# Patient Record
Sex: FEMALE | Race: WHITE | NOT HISPANIC OR LATINO | Employment: FULL TIME | URBAN - METROPOLITAN AREA
[De-identification: names, ages, dates, MRNs, and addresses within clinical notes are randomized per-mention and may not be internally consistent; named-entity substitution may affect disease eponyms.]

---

## 2021-06-11 ENCOUNTER — OFFICE VISIT (OUTPATIENT)
Dept: OBSTETRICS AND GYNECOLOGY | Facility: CLINIC | Age: 51
End: 2021-06-11
Payer: COMMERCIAL

## 2021-06-11 VITALS
HEART RATE: 67 BPM | WEIGHT: 185 LBS | SYSTOLIC BLOOD PRESSURE: 123 MMHG | HEIGHT: 67 IN | DIASTOLIC BLOOD PRESSURE: 83 MMHG | BODY MASS INDEX: 29.03 KG/M2

## 2021-06-11 DIAGNOSIS — Z01.419 ENCOUNTER FOR WELL WOMAN EXAM WITH ROUTINE GYNECOLOGICAL EXAM: ICD-10-CM

## 2021-06-11 DIAGNOSIS — Z12.31 BREAST CANCER SCREENING BY MAMMOGRAM: ICD-10-CM

## 2021-06-11 PROCEDURE — 3008F BODY MASS INDEX DOCD: CPT | Mod: CPTII,S$GLB,, | Performed by: OBSTETRICS & GYNECOLOGY

## 2021-06-11 PROCEDURE — 99396 PR PREVENTIVE VISIT,EST,40-64: ICD-10-PCS | Mod: S$GLB,,, | Performed by: OBSTETRICS & GYNECOLOGY

## 2021-06-11 PROCEDURE — 99396 PREV VISIT EST AGE 40-64: CPT | Mod: S$GLB,,, | Performed by: OBSTETRICS & GYNECOLOGY

## 2021-06-11 PROCEDURE — 3008F PR BODY MASS INDEX (BMI) DOCUMENTED: ICD-10-PCS | Mod: CPTII,S$GLB,, | Performed by: OBSTETRICS & GYNECOLOGY

## 2021-06-11 RX ORDER — LIOTHYRONINE SODIUM 5 UG/1
5 TABLET ORAL DAILY
COMMUNITY
Start: 2021-04-07 | End: 2022-06-17

## 2021-06-11 RX ORDER — LEVOTHYROXINE SODIUM 100 UG/1
100 TABLET ORAL DAILY
COMMUNITY
Start: 2021-04-07

## 2021-07-02 ENCOUNTER — TELEPHONE (OUTPATIENT)
Dept: OBSTETRICS AND GYNECOLOGY | Facility: CLINIC | Age: 51
End: 2021-07-02

## 2022-06-17 ENCOUNTER — OFFICE VISIT (OUTPATIENT)
Dept: OBSTETRICS AND GYNECOLOGY | Facility: CLINIC | Age: 52
End: 2022-06-17
Payer: COMMERCIAL

## 2022-06-17 VITALS
BODY MASS INDEX: 26.84 KG/M2 | HEIGHT: 67 IN | DIASTOLIC BLOOD PRESSURE: 76 MMHG | WEIGHT: 171 LBS | SYSTOLIC BLOOD PRESSURE: 109 MMHG

## 2022-06-17 DIAGNOSIS — Z12.31 BREAST CANCER SCREENING BY MAMMOGRAM: ICD-10-CM

## 2022-06-17 DIAGNOSIS — Z78.0 ENCOUNTER FOR OSTEOPOROSIS SCREENING IN ASYMPTOMATIC POSTMENOPAUSAL PATIENT: ICD-10-CM

## 2022-06-17 DIAGNOSIS — Z13.820 ENCOUNTER FOR OSTEOPOROSIS SCREENING IN ASYMPTOMATIC POSTMENOPAUSAL PATIENT: ICD-10-CM

## 2022-06-17 DIAGNOSIS — Z01.419 ENCOUNTER FOR WELL WOMAN EXAM WITH ROUTINE GYNECOLOGICAL EXAM: Primary | ICD-10-CM

## 2022-06-17 PROCEDURE — 3008F BODY MASS INDEX DOCD: CPT | Mod: CPTII,S$GLB,, | Performed by: OBSTETRICS & GYNECOLOGY

## 2022-06-17 PROCEDURE — 3074F SYST BP LT 130 MM HG: CPT | Mod: CPTII,S$GLB,, | Performed by: OBSTETRICS & GYNECOLOGY

## 2022-06-17 PROCEDURE — 1159F PR MEDICATION LIST DOCUMENTED IN MEDICAL RECORD: ICD-10-PCS | Mod: CPTII,S$GLB,, | Performed by: OBSTETRICS & GYNECOLOGY

## 2022-06-17 PROCEDURE — 3074F PR MOST RECENT SYSTOLIC BLOOD PRESSURE < 130 MM HG: ICD-10-PCS | Mod: CPTII,S$GLB,, | Performed by: OBSTETRICS & GYNECOLOGY

## 2022-06-17 PROCEDURE — 3078F PR MOST RECENT DIASTOLIC BLOOD PRESSURE < 80 MM HG: ICD-10-PCS | Mod: CPTII,S$GLB,, | Performed by: OBSTETRICS & GYNECOLOGY

## 2022-06-17 PROCEDURE — 3008F PR BODY MASS INDEX (BMI) DOCUMENTED: ICD-10-PCS | Mod: CPTII,S$GLB,, | Performed by: OBSTETRICS & GYNECOLOGY

## 2022-06-17 PROCEDURE — 99396 PR PREVENTIVE VISIT,EST,40-64: ICD-10-PCS | Mod: S$GLB,,, | Performed by: OBSTETRICS & GYNECOLOGY

## 2022-06-17 PROCEDURE — 99396 PREV VISIT EST AGE 40-64: CPT | Mod: S$GLB,,, | Performed by: OBSTETRICS & GYNECOLOGY

## 2022-06-17 PROCEDURE — 1159F MED LIST DOCD IN RCRD: CPT | Mod: CPTII,S$GLB,, | Performed by: OBSTETRICS & GYNECOLOGY

## 2022-06-17 PROCEDURE — 3078F DIAST BP <80 MM HG: CPT | Mod: CPTII,S$GLB,, | Performed by: OBSTETRICS & GYNECOLOGY

## 2022-06-17 RX ORDER — TOPIRAMATE 50 MG/1
TABLET, FILM COATED ORAL
COMMUNITY
Start: 2022-04-14 | End: 2023-11-10

## 2022-06-17 RX ORDER — PANTOPRAZOLE SODIUM 40 MG/1
40 TABLET, DELAYED RELEASE ORAL DAILY
COMMUNITY
Start: 2022-04-11

## 2022-06-17 NOTE — PROGRESS NOTES
CC: WELL WOMAN (age 45-59)-perimenopausal  Patient Care Team:  Barbra Ingram MD as PCP - General (General Practice)  Boaz Evans MD (Gastroenterology)    The patient's last visit with me was on 2021 for wwe    HPI:  Patient is a 51 y.o. who presents for her well woman exam today.  History reviewed with patient.   Patient is without complaints or concerns today. Periods still regular but light and only last 1-2 days.  Only occasional hot flashes, mostly at night right before period starts.     Patient's last menstrual period was 2022 (approximate).       CONTRACEPTION: none  HX ABNL PAPS: none    REVIEW OF PRIOR DATA/ HEALTH MAINTENANCE:  LAST ANNUAL/ PAP:   2021   LAST MMG (screening)- May 14 2021- normal at [facility]    LAST LABS- in the last few months with pcp   LAST COLONOSCOPY- - polyps- Q 3 yrs- by Dr. Evans        Past Medical History:   Diagnosis Date    Hypothyroid     OAB (overactive bladder)     saw lakhwinder- resolved once she stopped her apple cider vinegar    Ovarian cyst     Right elbow tendonitis      SURGICAL HX:   has a past surgical history that includes Tonsillectomy; Exploratory laparotomy; Uterine suspension; Sinus surgery (); Foot surgery (); Hemorrhoid surgery (); and Surgical removal of Garica's neuroma.    SOCIAL HX:    reports that she has never smoked. She has never used smokeless tobacco. She reports previous alcohol use. She reports that she does not use drugs.    FAMILY HX:   family history includes Heart attack in her mother; Melanoma (age of onset: 34) in her father; No Known Problems in her brother, maternal grandfather, maternal grandmother, paternal grandfather, paternal grandmother, and sister. .    ALLERGIES:  Erythromycin, Pcn [penicillins], Sulfa (sulfonamide antibiotics), and Tylox [oxycodone-acetaminophen]    Current Outpatient Medications:     levothyroxine (SYNTHROID) 100 MCG tablet, Take 100 mcg by mouth once  "daily., Disp: , Rfl:     pantoprazole (PROTONIX) 40 MG tablet, Take 40 mg by mouth once daily., Disp: , Rfl:     topiramate (TOPAMAX) 50 MG tablet, TAKE ONE TO TWO TABLETS BY MOUTH EVERY NIGHT AT BEDTIME, Disp: , Rfl:     ROS:  CONST:  No fever, chills, fatigue or unexpected changes in weight  CV: No chest pain or palpitations  RESP:  No shortness of breath or cough  GI: No abd pain, vomiting, diarrhea, blood in stool, or changes in bowel mvmts  SKIN: No rashes or lesions  MUSCULOSKELETAL: No joint swelling or pain  PSYCH: No changes in mood or insomia  BREASTS: No asymmetry, lumps, pain, nipple discharge, or skin changes  :  No dysuria, urgency, frequency, hematuria or incontinence          No vag dc, itching, odor or dryness          No pelvic pain, dyspareunia, or abnormal vaginal bleeding    VITALS:  Blood pressure 109/76, height 5' 7" (1.702 m), weight 77.6 kg (171 lb), last menstrual period 06/03/2022.  Body mass index is 26.78 kg/m².     PHYSICAL EXAM-  APPEARANCE: Well appearing, in no acute distress.  NECK: Neck symmetric without masses or thyromegaly.  CV:  Normal rate  PULM: Normal resp rate, no resp distress, normal resp effort  PSYCH: Normal mood and affect, cooperative  SKIN: No rashes, lesions, or abnormal bruising  LYMPH: No inguinal or axillary adenopathy  ABD: Soft, without tenderness or masses. No palpable hernias or hsm  BREASTS: Symmetrical, no skin changes or lesions. No palpable masses, tenderness, or nipple dc  PELVIC:  VULVA: No lesions. Normal female genitalia.  URETHRAL MEATUS: No masses, no prolapse.  BLADDER/ URETHRA: No masses or suprapubic tenderness  VAGINA: No discharge, erythema, or lesions. No significant cystocele or rectocele.   CVX: No lesions,no cervical motion tenderness   UTERUS: Normal size/shape, mobile, non-tender  ADNEXA: No masses, tenderness, or fullness.  ANUS/ PERINEUM: Normal tone.  No lesions.  *female chaperone present for entire exam    ASSESSMENT and " PLAN:  Encounter for well woman exam with routine gynecological exam  -     Liquid-based pap smear, screening    Breast cancer screening by mammogram  -     Mammo Digital Screening Bilat w/ Tonio; Future; Expected date: 07/01/2022    Encounter for osteoporosis screening in asymptomatic postmenopausal patient  -     DXA Bone Density Spine And Hip; Future; Expected date: 07/01/2022     FOLLOWUP:  1 year for wwe or sooner prn    COUNSELING:  Patient was counseled today on recommendation for yearly pelvic exam, current Pap guidelines, self breast exams, contraception, recommendations for annual screening mammograms, and routine screening colonoscopy at age 45.  Encouraged patient to see her PCP for other health maintenance.

## 2023-08-14 ENCOUNTER — PATIENT MESSAGE (OUTPATIENT)
Dept: GASTROENTEROLOGY | Facility: CLINIC | Age: 53
End: 2023-08-14
Payer: COMMERCIAL

## 2023-08-14 DIAGNOSIS — K21.9 GASTROESOPHAGEAL REFLUX DISEASE, UNSPECIFIED WHETHER ESOPHAGITIS PRESENT: Primary | ICD-10-CM

## 2023-08-14 DIAGNOSIS — Z86.010 PERSONAL HISTORY OF COLONIC POLYPS: ICD-10-CM

## 2023-08-16 ENCOUNTER — TELEPHONE (OUTPATIENT)
Dept: GASTROENTEROLOGY | Facility: CLINIC | Age: 53
End: 2023-08-16
Payer: COMMERCIAL

## 2023-08-16 NOTE — TELEPHONE ENCOUNTER
----- Message from Payal Caal sent at 8/16/2023 10:51 AM CDT -----  Contact: Rosario Ingram  Calling to follow up on a fax that was sent on 08/11 rg Dr Ingram wanting to know if an EGD can be done at the same time due to having a lot of toruble with her reflux and can be reached at /thanks/dbw

## 2023-08-17 NOTE — TELEPHONE ENCOUNTER
----- Message from Kaylen Peralta sent at 8/17/2023  8:41 AM CDT -----  Patient is calling in regards to medication for colonoscopy pre is not cover by insurance and if there's a medication that can be call in that's cover by insurance..Please call patient back at [phone].                Thanks  dell

## 2023-08-23 RX ORDER — MELOXICAM 7.5 MG/1
7.5 TABLET ORAL
COMMUNITY
Start: 2023-06-16 | End: 2023-11-10

## 2023-08-24 ENCOUNTER — OFFICE VISIT (OUTPATIENT)
Dept: OBSTETRICS AND GYNECOLOGY | Facility: CLINIC | Age: 53
End: 2023-08-24
Payer: COMMERCIAL

## 2023-08-24 VITALS — BODY MASS INDEX: 25.5 KG/M2 | DIASTOLIC BLOOD PRESSURE: 78 MMHG | SYSTOLIC BLOOD PRESSURE: 115 MMHG | WEIGHT: 158 LBS

## 2023-08-24 DIAGNOSIS — Z01.419 ENCOUNTER FOR WELL WOMAN EXAM WITH ROUTINE GYNECOLOGICAL EXAM: Primary | ICD-10-CM

## 2023-08-24 DIAGNOSIS — N92.6 MENSES, IRREGULAR: ICD-10-CM

## 2023-08-24 DIAGNOSIS — Z12.31 BREAST CANCER SCREENING BY MAMMOGRAM: ICD-10-CM

## 2023-08-24 PROCEDURE — 3074F PR MOST RECENT SYSTOLIC BLOOD PRESSURE < 130 MM HG: ICD-10-PCS | Mod: CPTII,S$GLB,, | Performed by: OBSTETRICS & GYNECOLOGY

## 2023-08-24 PROCEDURE — 99396 PR PREVENTIVE VISIT,EST,40-64: ICD-10-PCS | Mod: S$GLB,,, | Performed by: OBSTETRICS & GYNECOLOGY

## 2023-08-24 PROCEDURE — 3078F DIAST BP <80 MM HG: CPT | Mod: CPTII,S$GLB,, | Performed by: OBSTETRICS & GYNECOLOGY

## 2023-08-24 PROCEDURE — 3008F BODY MASS INDEX DOCD: CPT | Mod: CPTII,S$GLB,, | Performed by: OBSTETRICS & GYNECOLOGY

## 2023-08-24 PROCEDURE — 3078F PR MOST RECENT DIASTOLIC BLOOD PRESSURE < 80 MM HG: ICD-10-PCS | Mod: CPTII,S$GLB,, | Performed by: OBSTETRICS & GYNECOLOGY

## 2023-08-24 PROCEDURE — 3008F PR BODY MASS INDEX (BMI) DOCUMENTED: ICD-10-PCS | Mod: CPTII,S$GLB,, | Performed by: OBSTETRICS & GYNECOLOGY

## 2023-08-24 PROCEDURE — 3074F SYST BP LT 130 MM HG: CPT | Mod: CPTII,S$GLB,, | Performed by: OBSTETRICS & GYNECOLOGY

## 2023-08-24 PROCEDURE — 1159F MED LIST DOCD IN RCRD: CPT | Mod: CPTII,S$GLB,, | Performed by: OBSTETRICS & GYNECOLOGY

## 2023-08-24 PROCEDURE — 1159F PR MEDICATION LIST DOCUMENTED IN MEDICAL RECORD: ICD-10-PCS | Mod: CPTII,S$GLB,, | Performed by: OBSTETRICS & GYNECOLOGY

## 2023-08-24 PROCEDURE — 99396 PREV VISIT EST AGE 40-64: CPT | Mod: S$GLB,,, | Performed by: OBSTETRICS & GYNECOLOGY

## 2023-08-24 RX ORDER — NITROFURANTOIN 25; 75 MG/1; MG/1
CAPSULE ORAL
COMMUNITY
Start: 2023-08-16 | End: 2023-11-10

## 2023-08-24 NOTE — PROGRESS NOTES
CC: WELL WOMAN (age 45-59)-perimenopausal  Patient Care Team:  Barbra Ingram MD as PCP - General (General Practice)  Boaz Evans MD (Gastroenterology)    HPI:  Patient is a 53 y.o. who presents for her well woman exam today.  History reviewed with patient. Would like to check hormones as her cycles are irrgular now and started having night sweats. Just checked thyroid and normal with pcp  Patient is without complaints or concerns today.     Last visit with me was on  2022 for wwe    CONTRACEPTION: none  HX ABNL PAPS: none    REVIEW OF PRIOR DATA/ HEALTH MAINTENANCE:  LAST ANNUAL:   2022   LAST MMG (screening)- 2022- normal at [facility] -pcp orders   LAST LABS- up to date with PCP    LAST COLONOSCOPY- - by Dr. Evans - q 3 yrs for polyps     Past Medical History:   Diagnosis Date    BMI 25.0-25.9,adult     Hypothyroid     OAB (overactive bladder)     saw lakhwinder- resolved once she stopped her apple cider vinegar    Ovarian cyst     Right elbow tendonitis     Shoulder injury     Possible torn tendon     SURGICAL HX:   has a past surgical history that includes Tonsillectomy; Exploratory laparotomy; Uterine suspension; Sinus surgery (); Foot surgery (Left, ); Hemorrhoid surgery (); Surgical removal of Garcia's neuroma (Left); Colonoscopy (2020); Colonoscopy (2014); and [place] tooth extraction.    SOCIAL HX:    reports that she has never smoked. She has never used smokeless tobacco. She reports that she does not currently use alcohol after a past usage of about 2.0 standard drinks of alcohol per week. She reports that she does not use drugs.    FAMILY HX:   family history includes Colon cancer (age of onset: 34) in her maternal cousin; Heart attack in her mother; Liver disease (age of onset: 60) in her brother; Melanoma (age of onset: 61) in her father; No Known Problems in her maternal grandfather, maternal grandmother, paternal grandfather, and paternal  grandmother. .    ALLERGIES:  Erythromycin, Pcn [penicillins], Sulfa (sulfonamide antibiotics), and Tylox [oxycodone-acetaminophen]    Current Outpatient Medications   Medication Instructions    levothyroxine (SYNTHROID) 100 mcg, Oral, Daily    meloxicam (MOBIC) 7.5 mg, Oral    nitrofurantoin, macrocrystal-monohydrate, (MACROBID) 100 MG capsule TAKE ONE CAPSULE TWICE EVERY DAY FOR SEVEN DAYS    pantoprazole (PROTONIX) 40 mg, Oral, Daily    topiramate (TOPAMAX) 50 MG tablet TAKE ONE TO TWO TABLETS BY MOUTH EVERY NIGHT AT BEDTIME    WEGOVY 1.7 mg/0.75 mL PnIj INJECT 1.7 MILLIGRAMS BY SUBCUTANEOUS ROUTE EVERY WEEK ON THE SAME DAY OF EACH WEEK     ROS:  CONST:  No fever, chills, fatigue or unexpected changes in weight   CV: No chest pain or palpitations   RESP:  No shortness of breath or cough   GI: No abd pain, vomiting, diarrhea, blood in stool, or changes in bowel mvmts   SKIN: No rashes or lesions  MUSCULOSKELETAL: No joint swelling or pain   PSYCH: No changes in mood or insomia   BREASTS: No asymmetry, lumps, pain, nipple discharge, or skin changes   :  No dysuria, urgency, frequency, hematuria or incontinence           No vag dc, itching, odor or dryness           No pelvic pain, dyspareunia, or abnormal vaginal bleeding     VITALS:  Blood pressure 115/78, weight 71.7 kg (158 lb), last menstrual period 07/26/2023.  Body mass index is 25.5 kg/m².     PHYSICAL EXAM-  APPEARANCE: Well appearing, in no acute distress.   NECK: Neck symmetric.   CV:  Normal rate   PULM: Normal resp rate, no resp distress, normal resp effort    PSYCH: Normal mood and affect, cooperative   SKIN: No rashes, lesions, or abnormal bruising   LYMPH: No inguinal or axillary adenopathy   ABD: Soft, without tenderness or masses.   BREAST: Symmetrical, no nipple changes, no skin changes, No palpable masses   PELVIC:  VULVA: No lesions. Normal female genitalia.  URETHRAL MEATUS: No masses, no significant prolapse.   BLADDER/ URETHRA: No masses or  suprapubic tenderness   VAGINA: No lesions or erythema, No discharge.   CVX: No lesions,no cervical motion tenderness.   UTERUS: Normal size/shape, mobile, non-tender   ADNEXA: No masses, tenderness, or fullness.   ANUS/ PERINEUM: Normal tone.  No lesions.     *female chaperone present for entire exam      ASSESSMENT and PLAN:  Encounter for well woman exam with routine gynecological exam  -     Liquid-based pap smear, screening    Breast cancer screening by mammogram  -     Mammo Digital Screening Bilat w/ Tonio; Future; Expected date: 09/06/2023    Menses, irregular  -     Follicle Stimulating Hormone; Future  -     Luteinizing Hormone; Future       FOLLOWUP:  1 year for wwe or sooner prn    COUNSELING:  Patient was counseled today on recommendation for yearly pelvic exam, current Pap guidelines, self breast exams, contraception, recommendations for annual screening mammograms, and routine screening colonoscopy at age 45.  Encouraged patient to see her PCP for other health maintenance.

## 2023-08-31 LAB — Lab: NORMAL

## 2023-10-02 ENCOUNTER — TELEPHONE (OUTPATIENT)
Dept: GASTROENTEROLOGY | Facility: CLINIC | Age: 53
End: 2023-10-02
Payer: COMMERCIAL

## 2023-10-02 VITALS — HEIGHT: 66 IN | BODY MASS INDEX: 25.39 KG/M2 | WEIGHT: 158 LBS

## 2023-10-02 DIAGNOSIS — K21.9 GASTROESOPHAGEAL REFLUX DISEASE, UNSPECIFIED WHETHER ESOPHAGITIS PRESENT: Primary | ICD-10-CM

## 2023-10-02 DIAGNOSIS — Z86.010 PERSONAL HISTORY OF COLONIC POLYPS: ICD-10-CM

## 2023-10-02 NOTE — TELEPHONE ENCOUNTER
"[facility]  [address] [address]  Phone: [phone]  Fax: [phone]    History & Physical         Provider: Dr. Rainer Mondragon    Patient Name: Marisela GUEVARA (age):1970  53 y.o.           Gender: female   Phone: [phone]     Referring Physician: Barbra Ingram     Vital Signs:   Height - 5' 6"  Weight - 158 LB  BMI -  25.50    Plan: EGD/COLONOSCOPY @ [facility]    Encounter Diagnoses   Name Primary?    Gastroesophageal reflux disease, unspecified whether esophagitis present Yes    Personal history of colonic polyps            History:      Past Medical History:   Diagnosis Date    BMI 25.0-25.9,adult     Hypothyroid     OAB (overactive bladder)     saw lakhwinder- resolved once she stopped her apple cider vinegar    Ovarian cyst     Right elbow tendonitis     Shoulder injury     Possible torn tendon      Past Surgical History:   Procedure Laterality Date    COLONOSCOPY  2020    COLONOSCOPY  2014    EXPLORATORY LAPAROTOMY      FOOT SURGERY Left     HEMORRHOID SURGERY      SINUS SURGERY      SURGICAL REMOVAL OF VIEIRA'S NEUROMA Left     TONSILLECTOMY      UTERINE SUSPENSION      WISDOM TOOTH EXTRACTION        Medication List with Changes/Refills   Current Medications    LEVOTHYROXINE (SYNTHROID) 100 MCG TABLET    Take 100 mcg by mouth once daily.    MELOXICAM (MOBIC) 7.5 MG TABLET    Take 7.5 mg by mouth.    NITROFURANTOIN, MACROCRYSTAL-MONOHYDRATE, (MACROBID) 100 MG CAPSULE    TAKE ONE CAPSULE TWICE EVERY DAY FOR SEVEN DAYS    PANTOPRAZOLE (PROTONIX) 40 MG TABLET    Take 40 mg by mouth once daily.    TOPIRAMATE (TOPAMAX) 50 MG TABLET    TAKE ONE TO TWO TABLETS BY MOUTH EVERY NIGHT AT BEDTIME    WEGOVY 1.7 MG/0.75 ML PNIJ    INJECT 1.7 MILLIGRAMS BY SUBCUTANEOUS ROUTE EVERY WEEK ON THE SAME DAY OF EACH WEEK      Review of patient's allergies indicates:   Allergen " Reactions    Erythromycin Rash    Pcn [penicillins] Rash    Sulfa (sulfonamide antibiotics) Swelling     Throat closes    Tylox [oxycodone-acetaminophen] Rash      Family History   Problem Relation Age of Onset    Heart attack Mother     Melanoma Father 61    Liver disease Brother 60    No Known Problems Maternal Grandmother     No Known Problems Maternal Grandfather     No Known Problems Paternal Grandmother     No Known Problems Paternal Grandfather     Colon cancer Maternal Cousin 34      Social History     Tobacco Use    Smoking status: Never    Smokeless tobacco: Never   Substance Use Topics    Alcohol use: Not Currently     Alcohol/week: 2.0 standard drinks of alcohol     Types: 2 Glasses of wine per week    Drug use: Never        Physical Examination:     General Appearance:___________________________  HEENT: _____________________________________  Abdomen:____________________________________  Heart:________________________________________  Lungs:_______________________________________  Extremities:___________________________________  Skin:_________________________________________  Endocrine:____________________________________  Genitourinary:_________________________________  Neurological:__________________________________      Patient has been evaluated immediately prior to sedation and is medically cleared for endoscopy with IVCS as an ASA class: ______      Physician Signature: _________________________       Date: ________  Time: ________

## 2023-10-23 ENCOUNTER — OUTSIDE PLACE OF SERVICE (OUTPATIENT)
Dept: GASTROENTEROLOGY | Facility: CLINIC | Age: 53
End: 2023-10-23

## 2023-10-23 PROCEDURE — 43239 PR EGD, FLEX, W/BIOPSY, SGL/MULTI: ICD-10-PCS | Mod: ,,, | Performed by: INTERNAL MEDICINE

## 2023-10-23 PROCEDURE — 43239 EGD BIOPSY SINGLE/MULTIPLE: CPT | Mod: ,,, | Performed by: INTERNAL MEDICINE

## 2023-10-23 PROCEDURE — 45385 COLONOSCOPY W/LESION REMOVAL: CPT | Mod: 33,,, | Performed by: INTERNAL MEDICINE

## 2023-10-23 PROCEDURE — 45385 PR COLONOSCOPY,REMV LESN,SNARE: ICD-10-PCS | Mod: 33,,, | Performed by: INTERNAL MEDICINE

## 2023-10-24 LAB — SPECIMEN TO PATHOLOGY: NORMAL

## 2023-10-25 NOTE — PROGRESS NOTES
Call with results, bx's of esophagus show reflux-continue  pantoprazole qD.  Polyp ok-repeat colon in 5 yrs.  Will need to be put in system for repeat colon in 5 yrs.

## 2023-11-07 ENCOUNTER — PATIENT MESSAGE (OUTPATIENT)
Dept: OBSTETRICS AND GYNECOLOGY | Facility: CLINIC | Age: 53
End: 2023-11-07
Payer: COMMERCIAL

## 2023-11-07 NOTE — TELEPHONE ENCOUNTER
Marisela had her fsh done in august and it was 35. She has a 2-3 day cycle typically but this month she is on her 14th day. Please advise and I will call patient.    able

## 2023-11-09 ENCOUNTER — PROCEDURE VISIT (OUTPATIENT)
Dept: OBSTETRICS AND GYNECOLOGY | Facility: CLINIC | Age: 53
End: 2023-11-09
Payer: COMMERCIAL

## 2023-11-09 DIAGNOSIS — N92.1 MENORRHAGIA WITH IRREGULAR CYCLE: Primary | ICD-10-CM

## 2023-11-09 DIAGNOSIS — R10.2 PELVIC PAIN: ICD-10-CM

## 2023-11-09 DIAGNOSIS — N92.1 MENORRHAGIA WITH IRREGULAR CYCLE: ICD-10-CM

## 2023-11-09 PROCEDURE — 76830 US OB/GYN PROCEDURE (VIEWPOINT): ICD-10-PCS | Mod: S$GLB,,, | Performed by: OBSTETRICS & GYNECOLOGY

## 2023-11-09 PROCEDURE — 76830 TRANSVAGINAL US NON-OB: CPT | Mod: S$GLB,,, | Performed by: OBSTETRICS & GYNECOLOGY

## 2023-11-09 NOTE — PROGRESS NOTES
Us done for abnormal bleeding for 14 days and her FSH in aug was 35.6.  Please let her know she is getting close to menopause but not quite there. However, since this was longer and heavier than her normal bleeding, and the lining is not super thin, I would recommend we do an EMB to make sure that lining is not becoming abnomal. If it is all benign, we can do some progesterone to stop the bleeding. They did see a tiny fibroid but that is unrelated and at the top part of her uterus

## 2023-11-10 ENCOUNTER — TELEPHONE (OUTPATIENT)
Dept: OBSTETRICS AND GYNECOLOGY | Facility: CLINIC | Age: 53
End: 2023-11-10
Payer: COMMERCIAL

## 2023-11-10 RX ORDER — DICLOFENAC SODIUM 75 MG/1
75 TABLET, DELAYED RELEASE ORAL 2 TIMES DAILY
COMMUNITY
Start: 2023-10-13

## 2023-11-10 NOTE — TELEPHONE ENCOUNTER
----- Message from Renata Martino MD sent at 11/9/2023  5:28 PM CST -----  Us done for abnormal bleeding for 14 days and her FSH in aug was 35.6.  Please let her know she is getting close to menopause but not quite there. However, since this was longer and heavier than her normal bleeding, and the lining is not super thin, I would recommend we do an EMB to make sure that lining is not becoming abnomal. If it is all benign, we can do some progesterone to stop the bleeding. They did see a tiny fibroid but that is unrelated and at the top part of her uterus

## 2023-11-10 NOTE — TELEPHONE ENCOUNTER
Spoke with patient. Two pt identifiers confirmed. Notified patient of her ultrasound results. Patient verbalized understanding. Scheduled patient for an EMB.

## 2023-11-13 ENCOUNTER — PROCEDURE VISIT (OUTPATIENT)
Dept: OBSTETRICS AND GYNECOLOGY | Facility: CLINIC | Age: 53
End: 2023-11-13
Payer: COMMERCIAL

## 2023-11-13 VITALS
BODY MASS INDEX: 24.43 KG/M2 | DIASTOLIC BLOOD PRESSURE: 76 MMHG | SYSTOLIC BLOOD PRESSURE: 112 MMHG | WEIGHT: 152 LBS | HEIGHT: 66 IN

## 2023-11-13 DIAGNOSIS — N92.6 MENSES, IRREGULAR: Primary | ICD-10-CM

## 2023-11-13 PROCEDURE — 58100 PR BIOPSY OF UTERUS LINING: ICD-10-PCS | Mod: 53,S$GLB,, | Performed by: OBSTETRICS & GYNECOLOGY

## 2023-11-13 PROCEDURE — 58100 BIOPSY OF UTERUS LINING: CPT | Mod: 53,S$GLB,, | Performed by: OBSTETRICS & GYNECOLOGY

## 2023-11-13 PROCEDURE — 99499 NO LOS: ICD-10-PCS | Mod: S$GLB,,, | Performed by: OBSTETRICS & GYNECOLOGY

## 2023-11-13 PROCEDURE — 99499 UNLISTED E&M SERVICE: CPT | Mod: S$GLB,,, | Performed by: OBSTETRICS & GYNECOLOGY

## 2023-11-13 RX ORDER — PROGESTERONE 200 MG/1
200 CAPSULE ORAL DAILY
Qty: 30 CAPSULE | Refills: 1 | Status: SHIPPED | OUTPATIENT
Start: 2023-11-13 | End: 2023-12-05

## 2023-11-13 NOTE — PROCEDURES
Endometrial biopsy    Date/Time: 11/13/2023 9:15 AM    Performed by: Renata Martino MD  Authorized by: Renata Martino MD    Consent:     Consent obtained:  Verbal    Consent given by:  Patient    Patient questions answered: yes      Patient agrees, verbalizes understanding, and wants to proceed: yes    Indication:     Indications: Other disorder of menstruation and other abnormal bleeding from female genital tract    Procedure:     Procedure: endometrial biopsy with Pipelle      Cervix cleaned and prepped: yes      Specimen collected: insufficient sample size      Patient tolerated procedure well with no complications: yes      Unable to perform due to: pain and cervical stenosis    Comments:     Procedure comments:  Fsh=35, ems=5mm but prolonged bleeding x 14 days  Hypothyroid but levels normal  -pt cramping already and unable to tolerate emb pipelle. Discussed options and will recheck ems after 6-8 weeks of progesterone. Pt having shoulder surgery next week and wants bleeding stopped by then

## 2023-11-30 DIAGNOSIS — R93.89 THICKENED ENDOMETRIUM: Primary | ICD-10-CM

## 2023-12-05 ENCOUNTER — PATIENT MESSAGE (OUTPATIENT)
Dept: OBSTETRICS AND GYNECOLOGY | Facility: CLINIC | Age: 53
End: 2023-12-05
Payer: COMMERCIAL

## 2023-12-05 DIAGNOSIS — N92.1 MENORRHAGIA WITH IRREGULAR CYCLE: Primary | ICD-10-CM

## 2023-12-05 DIAGNOSIS — N92.6 MENSES, IRREGULAR: ICD-10-CM

## 2023-12-05 RX ORDER — PROGESTERONE 200 MG/1
200 CAPSULE ORAL
Qty: 90 CAPSULE | Refills: 0 | Status: SHIPPED | OUTPATIENT
Start: 2023-12-05 | End: 2024-01-26

## 2023-12-05 NOTE — TELEPHONE ENCOUNTER
Her FSH was 35.6 in August and ems was 5mm but pt unable to tolerate emb so started on 200mg prometrium. I believe she was about to have shoulder surgery- if she did, the anesthesia/medications/stress of surgery is definitely enough to cause some irregular bleeding. As long as it isnt heavy, it is ok. If she wants to do bid progesterone for a few days, that may make it stop.

## 2023-12-05 NOTE — TELEPHONE ENCOUNTER
Patient had an EMB November 13. She has been on progesterone since and started bleeding again. Please advise and I will call patient.

## 2023-12-12 ENCOUNTER — PATIENT MESSAGE (OUTPATIENT)
Dept: OBSTETRICS AND GYNECOLOGY | Facility: CLINIC | Age: 53
End: 2023-12-12
Payer: COMMERCIAL

## 2023-12-13 NOTE — TELEPHONE ENCOUNTER
She can double up on the progesterone till the ultrasound.  If that isnt working or lining is building up again, then we would consider a d&C

## 2023-12-15 ENCOUNTER — PROCEDURE VISIT (OUTPATIENT)
Dept: OBSTETRICS AND GYNECOLOGY | Facility: CLINIC | Age: 53
End: 2023-12-15
Payer: COMMERCIAL

## 2023-12-15 ENCOUNTER — OFFICE VISIT (OUTPATIENT)
Dept: OBSTETRICS AND GYNECOLOGY | Facility: CLINIC | Age: 53
End: 2023-12-15
Payer: COMMERCIAL

## 2023-12-15 VITALS
SYSTOLIC BLOOD PRESSURE: 115 MMHG | DIASTOLIC BLOOD PRESSURE: 71 MMHG | WEIGHT: 151 LBS | BODY MASS INDEX: 24.27 KG/M2 | HEIGHT: 66 IN

## 2023-12-15 DIAGNOSIS — N92.6 IRREGULAR BLEEDING: Primary | ICD-10-CM

## 2023-12-15 DIAGNOSIS — N92.4 EXCESSIVE BLEEDING IN PREMENOPAUSAL PERIOD: Primary | ICD-10-CM

## 2023-12-15 DIAGNOSIS — N92.6 IRREGULAR BLEEDING: ICD-10-CM

## 2023-12-15 LAB
APPEARANCE, UA: CLEAR
B-HCG UR QL: NEGATIVE
BACTERIA SPEC CULT: ABNORMAL /HPF
BASOPHILS NFR BLD: 0.6 % (ref 0–3)
BILIRUB UR QL STRIP: NEGATIVE MG/DL
COLOR UR: ABNORMAL
EOSINOPHIL NFR BLD: 3.3 % (ref 1–3)
ERYTHROCYTE [DISTWIDTH] IN BLOOD BY AUTOMATED COUNT: 13 % (ref 12.5–18)
GLUCOSE (UA): NORMAL MG/DL
HCT VFR BLD AUTO: 36.7 % (ref 37–47)
HGB BLD-MCNC: 12.1 G/DL (ref 12–16)
HGB UR QL STRIP: 250 /UL
KETONES UR QL STRIP: NEGATIVE MG/DL
LEUKOCYTE ESTERASE UR QL STRIP: NEGATIVE /UL
LYMPHOCYTES NFR BLD: 25.9 % (ref 25–40)
MCH RBC QN AUTO: 28.9 PG (ref 27–31.2)
MCHC RBC AUTO-ENTMCNC: 33 G/DL (ref 31.8–35.4)
MCV RBC AUTO: 87.8 FL (ref 80–97)
MONOCYTES NFR BLD: 8.4 % (ref 1–15)
NEUTROPHILS # BLD AUTO: 3.9 10*3/UL (ref 1.8–7.7)
NEUTROPHILS NFR BLD: 61.5 % (ref 37–80)
NITRITE UR QL STRIP: NEGATIVE
NUCLEATED RED BLOOD CELLS: 0 %
PH UR STRIP: 6.5 PH (ref 5–9)
PLATELETS: 243 10*3/UL (ref 142–424)
PROT UR QL STRIP: NEGATIVE MG/DL
RBC # BLD AUTO: 4.18 10*6/UL (ref 4.2–5.4)
RBC #/AREA URNS HPF: ABNORMAL /HPF (ref 0–2)
RPR: NON REACTIVE
SERVICE COMMENT 03: ABNORMAL
SP GR UR STRIP: 1.01 (ref 1–1.03)
SPECIMEN COLLECTION METHOD, URINE: ABNORMAL
SQUAMOUS EPITHELIAL, UA: ABNORMAL /LPF
UROBILINOGEN UR STRIP-ACNC: NORMAL MG/DL
WBC # BLD: 6.3 10*3/UL (ref 4.6–10.2)
WBC #/AREA URNS HPF: ABNORMAL /HPF (ref 0–5)

## 2023-12-15 PROCEDURE — 1159F PR MEDICATION LIST DOCUMENTED IN MEDICAL RECORD: ICD-10-PCS | Mod: CPTII,S$GLB,, | Performed by: OBSTETRICS & GYNECOLOGY

## 2023-12-15 PROCEDURE — 3074F PR MOST RECENT SYSTOLIC BLOOD PRESSURE < 130 MM HG: ICD-10-PCS | Mod: CPTII,S$GLB,, | Performed by: OBSTETRICS & GYNECOLOGY

## 2023-12-15 PROCEDURE — 76830 US OB/GYN PROCEDURE (VIEWPOINT): ICD-10-PCS | Mod: S$GLB,,, | Performed by: OBSTETRICS & GYNECOLOGY

## 2023-12-15 PROCEDURE — 3078F DIAST BP <80 MM HG: CPT | Mod: CPTII,S$GLB,, | Performed by: OBSTETRICS & GYNECOLOGY

## 2023-12-15 PROCEDURE — 3074F SYST BP LT 130 MM HG: CPT | Mod: CPTII,S$GLB,, | Performed by: OBSTETRICS & GYNECOLOGY

## 2023-12-15 PROCEDURE — 99214 PR OFFICE/OUTPT VISIT, EST, LEVL IV, 30-39 MIN: ICD-10-PCS | Mod: 25,S$GLB,, | Performed by: OBSTETRICS & GYNECOLOGY

## 2023-12-15 PROCEDURE — 1159F MED LIST DOCD IN RCRD: CPT | Mod: CPTII,S$GLB,, | Performed by: OBSTETRICS & GYNECOLOGY

## 2023-12-15 PROCEDURE — 3078F PR MOST RECENT DIASTOLIC BLOOD PRESSURE < 80 MM HG: ICD-10-PCS | Mod: CPTII,S$GLB,, | Performed by: OBSTETRICS & GYNECOLOGY

## 2023-12-15 PROCEDURE — 99214 OFFICE O/P EST MOD 30 MIN: CPT | Mod: 25,S$GLB,, | Performed by: OBSTETRICS & GYNECOLOGY

## 2023-12-15 PROCEDURE — 76830 TRANSVAGINAL US NON-OB: CPT | Mod: S$GLB,,, | Performed by: OBSTETRICS & GYNECOLOGY

## 2023-12-15 PROCEDURE — 3008F PR BODY MASS INDEX (BMI) DOCUMENTED: ICD-10-PCS | Mod: CPTII,S$GLB,, | Performed by: OBSTETRICS & GYNECOLOGY

## 2023-12-15 PROCEDURE — 3008F BODY MASS INDEX DOCD: CPT | Mod: CPTII,S$GLB,, | Performed by: OBSTETRICS & GYNECOLOGY

## 2023-12-15 RX ORDER — HYDROCODONE BITARTRATE AND ACETAMINOPHEN 7.5; 325 MG/1; MG/1
TABLET ORAL
COMMUNITY
Start: 2023-11-20 | End: 2023-12-15

## 2023-12-15 RX ORDER — MELOXICAM 15 MG/1
15 TABLET ORAL
COMMUNITY
Start: 2023-11-21

## 2023-12-15 NOTE — PROGRESS NOTES
PREOP NOTE-  Patient Care Team:  Barbra Ingram MD as PCP - General (General Practice)  Boaz Evans MD (Gastroenterology)    CC:  discuss patient's decision for surgery and preoperative counseling    HPI:  Patient is a 53 y.o.  who would like to undergo an D&C/ hysteroscopy/ Novasure ablation  on 2023. She initially came in to review her ultrasound today and discuss more definitive options. She  is having persistent bleeding , sometimes heavy and has had to stop her progesterone as she doesn't feel well on it. Interested in ablation. FSH in perimenopausal range and discussed trying off the hrt but that some irregular bleeding due to hormone fluctuations can be expected. Pt doesn't want to try any other hormone treatment and would like to do an endometrial ablation to treat the bleeding. Discussed r/b/all options and answered all questions. Pt given information on procedure to review as well   LAST ANNUAL :  2023    Indication:  menorrhagia    CONTRACEPTION:   same sex partner    EMB: unable to obtain due to cervical stenosis    ULTRASOUND FINDINGS:  uterus:  8.4 x 4.2 x 3.9cm cm        anteverted        ovaries normal        EMS:  3 mm    Past Medical History:   Diagnosis Date    BMI 25.0-25.9,adult     Hypothyroid     OAB (overactive bladder)     saw lakhwinder- resolved once she stopped her apple cider vinegar    Ovarian cyst     Right elbow tendonitis     Shoulder injury     Possible torn tendon     SURGICAL HX:   has a past surgical history that includes Tonsillectomy; Exploratory laparotomy; Uterine suspension; Sinus surgery (); Foot surgery (Left, ); Hemorrhoid surgery (); Surgical removal of Garcia's neuroma (Left); Colonoscopy (2020); Colonoscopy (2014); and [place] tooth extraction.    SOCIAL HX:    reports that she has never smoked. She has never used smokeless tobacco. She reports that she does not currently use alcohol after a past usage of  "about 2.0 standard drinks of alcohol per week. She reports that she does not use drugs.    FAMILY HX:   family history includes Colon cancer (age of onset: 34) in her maternal cousin; Heart attack in her mother; Liver disease (age of onset: 60) in her brother; Melanoma (age of onset: 61) in her father; No Known Problems in her maternal grandfather, maternal grandmother, paternal grandfather, and paternal grandmother. .    ALLERGIES:  Erythromycin, Pcn [penicillins], Sulfa (sulfonamide antibiotics), and Tylox [oxycodone-acetaminophen]    Current Outpatient Medications:     diclofenac (VOLTAREN) 75 MG EC tablet, Take 75 mg by mouth 2 (two) times daily., Disp: , Rfl:     levothyroxine (SYNTHROID) 100 MCG tablet, Take 100 mcg by mouth once daily., Disp: , Rfl:     meloxicam (MOBIC) 15 MG tablet, Take 15 mg by mouth., Disp: , Rfl:     pantoprazole (PROTONIX) 40 MG tablet, Take 40 mg by mouth once daily., Disp: , Rfl:     WEGOVY 1.7 mg/0.75 mL PnIj, INJECT 1.7 MILLIGRAMS BY SUBCUTANEOUS ROUTE EVERY WEEK ON THE SAME DAY OF EACH WEEK, Disp: , Rfl:     progesterone (PROMETRIUM) 200 MG capsule, TAKE 1 CAPSULE BY MOUTH ONCE DAILY. (Patient not taking: Reported on 12/15/2023), Disp: 90 capsule, Rfl: 0    ROS:  CONST:  No fever, chills, fatigue or unexpected changes in weight   CV: No chest pain or palpitations   RESP:  No shortness of breath or cough   GI: No abd pain, vomiting, diarrhea, blood in stool, or changes in bowel mvmts   SKIN: No rashes or lesions  MUSCULOSKELETAL: No joint swelling or pain   PSYCH: No changes in mood or insomia   BREASTS: No asymmetry, lumps, pain, nipple discharge, or skin changes   :  No dysuria, urgency, frequency, hematuria or incontinence           No vag dc, itching, odor or dryness           No pelvic pain, dyspareunia, or abnormal vaginal bleeding     PHYSICAL EXAM:  Blood pressure 115/71, height 5' 6" (1.676 m), weight 68.5 kg (151 lb), last menstrual period 12/14/2023.  Body mass index " is 24.37 kg/m².     PHYSICAL EXAM-  APPEARANCE: Well appearing, in no acute distress.   NECK: Neck symmetric.   CV:  Normal rate   PULM: Normal resp rate, no resp distress, normal resp effort   PSYCH: Normal mood and affect, cooperative   SKIN: No visible rashes, lesions, or abnormal bruising   LYMPH: No inguinal adenopathy   ABD: Soft, without tenderness or masses.   BREAST:  deferred/declined   PELVIC:  VULVA: Normal female genitalia.No lesions.   URETHRAL MEATUS: No masses, no prolapse.  BLADDER/ URETHRA: No masses or suprapubic tenderness   VAGINA: No lesions or erythema, No discharge.   CVX: No lesions,no cervical motion tenderness.   UTERUS: Normal size/shape, mobile, non-tender.   ADNEXA: No masses, tenderness, or fullness.   ANUS/ PERINEUM: Normal tone.  No lesions.      *female chaperone present for entire exam       ASSESSMENT and PLAN:  Excessive bleeding in premenopausal period  -     Ambulatory Referral to External Surgery      FOLLOWUP:  6 week post op visit    PREOPERATIVE COUNSELING:  During the visit, the patients indications to the procedure were reviewed with her as well as the risks, benefits, and alternatives to the procedure. Patient declines any alternatives and expresses her desire to proceed with definitive surgical management.  Reviewed risks of surgery, including but not limited to: risks of anesthesia, infection, bleeding, injury to other organs, such as skin, nerves, arteries, veins, bowel, bladder, ureters, with possible need for reparative or subsequent surgery (such as bowel resection/repair,hernia repair,  bladder/ureter surgery, hysterectomy, removal of tubes and/or ovaries),  blood transfusion, and other unforseen surgical complications.   Postoperative risks of development of deep venous thrombosis, pulmonary embolus, myocardial infarction, infection, and any other postoperative complications, including possible death were also discussed.The patient verbalizes understanding.    Consents were reviewed and signed with the patient after all questions were answered and patient voiced understanding.  A copy of the preop orders and consents she signed were given to the patient to take home and review to make sure she had no additional questions prior to surgery.   Preoperative instructions and post operative limitations also reviewed with patient and all questions answered and educational materials on her diagnosis and upcoming procedure were offered to patient    *Total time spent: 40 minutes with 50 % or more spent on counseling about diagnosis, treatment options, and coordination of care.      ADDENDUM- pt currently on her antiinflammatory once daily for her recent rt shoulder scope and will continue to take daily through next week as directed. Patient still has some left over hydrocodone she can take as directed prn post op. Using wegovy and does next shot this weekend so will hold that shot until a couple days after her surgery.

## 2023-12-18 ENCOUNTER — OUTSIDE PLACE OF SERVICE (OUTPATIENT)
Dept: OBSTETRICS AND GYNECOLOGY | Facility: CLINIC | Age: 53
End: 2023-12-18

## 2023-12-18 LAB
GLUCOSE SERPL-MCNC: 123 MG/DL (ref 70–105)
GLUCOSE SERPL-MCNC: 135 MG/DL (ref 70–105)
GLUCOSE SERPL-MCNC: 49 MG/DL (ref 70–105)
GLUCOSE SERPL-MCNC: 69 MG/DL (ref 70–105)
GLUCOSE SERPL-MCNC: 70 MG/DL (ref 70–105)
GLUCOSE SERPL-MCNC: 90 MG/DL (ref 70–105)
GLUCOSE SERPL-MCNC: 98 MG/DL (ref 70–105)

## 2023-12-18 PROCEDURE — 58563 HYSTEROSCOPY ABLATION: CPT | Mod: ,,, | Performed by: OBSTETRICS & GYNECOLOGY

## 2023-12-18 PROCEDURE — 58563 PR HYSTEROSCOPY,W/ENDOMETRIAL ABLATION: ICD-10-PCS | Mod: ,,, | Performed by: OBSTETRICS & GYNECOLOGY

## 2023-12-20 ENCOUNTER — PATIENT MESSAGE (OUTPATIENT)
Dept: OBSTETRICS AND GYNECOLOGY | Facility: CLINIC | Age: 53
End: 2023-12-20
Payer: COMMERCIAL

## 2023-12-20 NOTE — PROGRESS NOTES
"Please let pt know her pathology was all benign. The lining itself was all normal. The small polyp that I saw once I did the hysteroscopy (couldn't see it on ultrasound) was likely part of the problem. The pathologist also detected some "hyperplasia" or overgrowth of tissue within the polyp. Over time, that hyperplasia has the potential to become more abnormal and start to get precancerous changes ("atypia"). There was no atypia in her polyp, but definitely better to know it is out and wont cause further issues for her. Please make sure she is doing well and let me know if she has any questions.  "

## 2024-02-26 ENCOUNTER — PATIENT MESSAGE (OUTPATIENT)
Dept: OBSTETRICS AND GYNECOLOGY | Facility: CLINIC | Age: 54
End: 2024-02-26
Payer: COMMERCIAL

## 2024-02-29 ENCOUNTER — PROCEDURE VISIT (OUTPATIENT)
Dept: OBSTETRICS AND GYNECOLOGY | Facility: CLINIC | Age: 54
End: 2024-02-29
Payer: COMMERCIAL

## 2024-02-29 DIAGNOSIS — R10.2 PELVIC PAIN: Primary | ICD-10-CM

## 2024-02-29 DIAGNOSIS — R10.2 PELVIC PAIN: ICD-10-CM

## 2024-02-29 PROCEDURE — 76856 US EXAM PELVIC COMPLETE: CPT | Mod: S$GLB,,, | Performed by: OBSTETRICS & GYNECOLOGY

## 2024-02-29 PROCEDURE — 76830 TRANSVAGINAL US NON-OB: CPT | Mod: S$GLB,,, | Performed by: OBSTETRICS & GYNECOLOGY

## 2024-03-02 DIAGNOSIS — N92.6 MENSES, IRREGULAR: ICD-10-CM

## 2024-03-02 DIAGNOSIS — N92.1 MENORRHAGIA WITH IRREGULAR CYCLE: ICD-10-CM

## 2024-03-04 ENCOUNTER — PATIENT MESSAGE (OUTPATIENT)
Dept: OBSTETRICS AND GYNECOLOGY | Facility: CLINIC | Age: 54
End: 2024-03-04
Payer: COMMERCIAL

## 2024-03-04 RX ORDER — PROGESTERONE 200 MG/1
200 CAPSULE ORAL
Qty: 90 CAPSULE | Refills: 0 | Status: SHIPPED | OUTPATIENT
Start: 2024-03-04

## 2024-03-04 NOTE — PROGRESS NOTES
Please let pt know her ultrasound looks good and doesn't show anything that would cause her low back pain/cramping. Its not impossible she could have had a small ovarian cyst rupture which was gone at time at ultrasound. Is she still feeling it? If so have her do ua and ucx at lab to rule that out as well

## 2024-08-19 ENCOUNTER — PATIENT MESSAGE (OUTPATIENT)
Dept: OBSTETRICS AND GYNECOLOGY | Facility: CLINIC | Age: 54
End: 2024-08-19
Payer: COMMERCIAL

## 2024-12-16 NOTE — PROGRESS NOTES
CC: WELL WOMAN (age 45-59)-perimenopausal  Patient Care Team:  Barbra Ingram MD as PCP - General (General Practice)    HPI:  Patient is a 54 y.o. who presents for her well woman exam today.  History reviewed with patient.   Patient is without complaints or concerns today. Still feels like she is having regular cycles but no bleeding since ablation. But is starting to get night sweats    Last visit with me was on  12/15/2023 for wwe    CONTRACEPTION: same sex partner  HX ABNL PAPS: none    REVIEW OF PRIOR DATA/ HEALTH MAINTENANCE:  LAST ANNUAL:   2023   LAST MMG- 2024-  [facility] -ordered by pcp    LAST COLONOSCOPY- - by Dr. Evans - q 3 yrs for polyps        Past Medical History:   Diagnosis Date    BMI 25.0-25.9,adult     Hypothyroid     OAB (overactive bladder)     saw lakhwinder- resolved once she stopped her apple cider vinegar    Ovarian cyst     Right elbow tendonitis      SURGICAL HX:   has a past surgical history that includes Tonsillectomy; Exploratory laparotomy; Uterine suspension; Sinus surgery (); Foot surgery (Left, ); Hemorrhoid surgery (); Surgical removal of Garcia's neuroma (Left); Colonoscopy (2020); Colonoscopy (2014); Cordell tooth extraction; Dilation and curettage of uterus (); and myosure uterine polypectomy ().    SOCIAL HX:    reports that she has never smoked. She has never used smokeless tobacco. She reports that she does not currently use alcohol after a past usage of about 2.0 standard drinks of alcohol per week. She reports that she does not use drugs.    Current Outpatient Medications   Medication Instructions    diclofenac (VOLTAREN) 75 mg, Oral, 2 times daily    levothyroxine (SYNTHROID) 100 mcg, Oral, Daily    meloxicam (MOBIC) 15 mg, Oral    pantoprazole (PROTONIX) 40 mg, Oral, Daily    progesterone (PROMETRIUM) 200 mg, Oral    WEGOVY 1.7 mg/0.75 mL PnIj INJECT 1.7 MILLIGRAMS BY SUBCUTANEOUS ROUTE EVERY WEEK ON THE SAME DAY OF  Mother and Father are Cecilia and Regan can be reached at [phone] and it is okay to leave a message.  Left 2nd message on parent's voicemail to call back [phone].    "EACH WEEK     VITALS:  Blood pressure 123/82, height 5' 6" (1.676 m), weight 67.1 kg (148 lb).  Body mass index is 23.89 kg/m².     PHYSICAL EXAM-  APPEARANCE: Well appearing, in no acute distress.  CV/ PULM: no resp distress, normal resp effort  PSYCH: Normal mood and affect, cooperative  SKIN: No rashes, lesions, or abnormal bruising  ABD: Soft, no masses, tenderness, or distension  BREASTS: No skin changes,nipple dc. No palpable masses or tenderness  PELVIC:  VULVA: Normal female genitalia. No lesions  BLADDER: No suprapubic tenderness  VAGINA/ CVX:  No lesions, no d/c  UTERUS: mobile, non-tender  ADNEXA: No masses, tenderness, or fullness.  ANUS/ PERINEUM: Normal tone.  No lesions.           *patient verbally consented for exam and female chaperone present for entire exam    ASSESSMENT and PLAN:  Encounter for well woman exam with routine gynecological exam  -     Liquid-based pap smear, screening    Breast cancer screening by mammogram  -     Mammo Digital Screening Bilat w/ Tonio; Future; Expected date: 12/29/2024       FOLLOWUP:  1 year for wwe or sooner prn    COUNSELING:  Patient was counseled today on recommendation for yearly pelvic exam, current Pap guidelines, self breast exams, contraception, recommendations for annual screening mammograms, and routine screening colonoscopy at age 45.  Encouraged patient to see her PCP for other health maintenance.       "

## 2024-12-17 ENCOUNTER — OFFICE VISIT (OUTPATIENT)
Dept: OBSTETRICS AND GYNECOLOGY | Facility: CLINIC | Age: 54
End: 2024-12-17
Payer: COMMERCIAL

## 2024-12-17 VITALS
DIASTOLIC BLOOD PRESSURE: 82 MMHG | HEIGHT: 66 IN | BODY MASS INDEX: 23.78 KG/M2 | SYSTOLIC BLOOD PRESSURE: 123 MMHG | WEIGHT: 148 LBS

## 2024-12-17 DIAGNOSIS — Z01.419 ENCOUNTER FOR WELL WOMAN EXAM WITH ROUTINE GYNECOLOGICAL EXAM: Primary | ICD-10-CM

## 2024-12-17 DIAGNOSIS — Z12.31 BREAST CANCER SCREENING BY MAMMOGRAM: ICD-10-CM

## 2024-12-17 PROCEDURE — 1159F MED LIST DOCD IN RCRD: CPT | Mod: CPTII,,, | Performed by: OBSTETRICS & GYNECOLOGY

## 2024-12-17 PROCEDURE — 3079F DIAST BP 80-89 MM HG: CPT | Mod: CPTII,,, | Performed by: OBSTETRICS & GYNECOLOGY

## 2024-12-17 PROCEDURE — 3074F SYST BP LT 130 MM HG: CPT | Mod: CPTII,,, | Performed by: OBSTETRICS & GYNECOLOGY

## 2024-12-17 PROCEDURE — 3008F BODY MASS INDEX DOCD: CPT | Mod: CPTII,,, | Performed by: OBSTETRICS & GYNECOLOGY

## 2024-12-17 PROCEDURE — 99396 PREV VISIT EST AGE 40-64: CPT | Mod: S$PBB,,, | Performed by: OBSTETRICS & GYNECOLOGY

## 2024-12-19 LAB — Lab: NORMAL
